# Patient Record
Sex: MALE | Race: WHITE
[De-identification: names, ages, dates, MRNs, and addresses within clinical notes are randomized per-mention and may not be internally consistent; named-entity substitution may affect disease eponyms.]

---

## 2020-01-31 ENCOUNTER — HOSPITAL ENCOUNTER (OUTPATIENT)
Dept: HOSPITAL 53 - M LAB REF | Age: 80
End: 2020-01-31
Attending: DERMATOLOGY
Payer: COMMERCIAL

## 2020-01-31 DIAGNOSIS — C44.222: Primary | ICD-10-CM

## 2020-03-04 ENCOUNTER — HOSPITAL ENCOUNTER (OUTPATIENT)
Dept: HOSPITAL 53 - M LAB REF | Age: 80
End: 2020-03-04
Attending: DERMATOLOGY
Payer: COMMERCIAL

## 2020-03-04 DIAGNOSIS — C44.222: Primary | ICD-10-CM

## 2020-09-22 ENCOUNTER — HOSPITAL ENCOUNTER (OUTPATIENT)
Dept: HOSPITAL 53 - M PAIN | Age: 80
End: 2020-09-22
Attending: NURSE PRACTITIONER
Payer: COMMERCIAL

## 2020-09-22 DIAGNOSIS — M96.1: Primary | ICD-10-CM

## 2020-09-22 DIAGNOSIS — Z79.891: ICD-10-CM

## 2020-12-22 ENCOUNTER — HOSPITAL ENCOUNTER (OUTPATIENT)
Dept: HOSPITAL 53 - M PAIN | Age: 80
End: 2020-12-22
Attending: NURSE PRACTITIONER
Payer: COMMERCIAL

## 2020-12-22 DIAGNOSIS — Z86.59: ICD-10-CM

## 2020-12-22 DIAGNOSIS — M47.816: Primary | ICD-10-CM

## 2020-12-22 DIAGNOSIS — Z79.899: ICD-10-CM

## 2020-12-22 DIAGNOSIS — Z87.891: ICD-10-CM

## 2020-12-22 DIAGNOSIS — K21.9: ICD-10-CM

## 2020-12-22 DIAGNOSIS — G89.29: ICD-10-CM

## 2020-12-31 NOTE — ECWPNPC
PATIENT NAME: HÉCTOR HINKLE

: 1940

GENDER: MALE

MRN: [id number]

VISIT DATE: 2020

DISCHARGE DATE: 20 1353

VISIT LOCKED DATE TIME: 

PHYSICIAN: PIETER YOUNG

RESOURCE: PIETER YOUNG

 

           

           

REASON FOR APPOINTMENT

           

          1. MED MANAGEMENT / UTOX

           

HISTORY OF PRESENT ILLNESS

           

      PAIN CENTER INTAKE QUESTIONS:

      DO YOU HAVE A HISTORY OF MRSA?

           

           

          :NO

           

      DO YOU TAKE A BLOOD THINNERS?

           

           

          :NO

           

      DO YOU HAVE ANY BLEEDING DISORDERS?

           

           

          :NO

           

      ANY NEW NUMBNESS OR WEAKNESS IN YOUR LEGS OR ARMS?

           

           

          :NO

           

      ANY PACEMAKER,DEFIBRILLATOR, OR DORSAL COLUMN

          STIMULATOR?

           

           

          :NO

           

      DO YOU HAVE ANY RASHES OR OPEN SORES?

           

           

          :NO

           

      ARE YOU ALLERGIC TO IV DYE?

           

           

          :NO

           

      ARE YOU DIABETIC?

           

           

          :NO

           

      ANY NEW PROBLEMS WITH YOUR MEDICATIONS?

           

           

          :NO

           

      HAVE YOU RECEIVED A VACCINE IN THE PAST 30 DAYS?

           

           

          :NO

           

      DO YOU PLAN TO RECEIVE A VACCINE IN THE NEXT 21

          DAYS?

           

           

          :NO

           

      DO YOU NEED ANY PRESCRIPTION?

           

           

          :NO

           

      DO YOU TAKE ANY IMMUNOSUPPRESSIVE MEDICATIONS?

           

           

          :NO

           

      IS THERE A CHANCE YOU COULD BE PREGNANT?

           

           

          :NO

           

      ARE YOU BREAST FEEDING?

           

           

          :NO

           

      GENERAL:  PATIENT IS HERE FOR 3 MONTH FOLLOW-UP AND

          MEDICATION MANAGEMENT FOR CHRONIC LOW BACK PAIN WITH HISTORY OF

          LUMBAR SURGERY. THIS IS INITIAL FOLLOW-UP AFTER FIRST VISIT 3

          MONTHS AGO. CONTINUES TO FIND HYDROCODONE 7.5/325 EVERY 6 HOURS

          AS NEEDED FOR PAIN HELPFUL AT REDUCING HIS PAIN WITHOUT SIDE

          EFFECTS. HE IS NOT INTERESTED IN INTERVENTIONAL THERAPIES. BRINGS

          IN HIS MEDICATION WHICH IS APPROPRIATE FOR WHAT WAS DISPENSED.

           -.

           

      FALL RISK SCREENING:

      SCREENING

           

           

          :NO FALLS REPORTED IN THE LAST YEAR

           

      PAIN SCREENING:

      PATIENT HAS A COMPLAINT OF ACUTE OR CHRONIC PAIN

           

           

          :YES

          LOCATION OF PAIN:RIGHT SHOULDER, LEFT SHOULDER, LOW BACK

          INTENSITY OF PAIN (SCALE OF 1 TO 10):6

          WHAT DOES YOUR PAIN FEEL LIKE:CONTINOUS, TENDER, ACHING

           

      NURSING NOTE:

           -UPDATED NARCOTIC AGREEMENT.

           

CURRENT MEDICATIONS

           

          TAKING FLOMAX 0.4 MG CAPSULE 1 CAPSULE 30 MINUTES AFTER THE SAME

          MEAL EACH DAY ORALLY ONCE A DAY

          TAKING HYDROCHLOROTHIAZIDE 25 MG TABLET 1 TABLET ORALLY ONCE A

          DAY

          TAKING PREVACID 15 MG CAPSULE DELAYED RELEASE 1 CAPSULE BEFORE A

          MEAL ORALLY BID

          TAKING FLUOXETINE HCL 20 MG CAPSULE 1 CAPSULE IN THE MORNING

          ORALLY ONCE A DAY

          TAKING LISINOPRIL 40 MG TABLET 1 TABLET ORALLY ONCE A DAY

          TAKING HYDROCODONE-ACETAMINOPHEN 7.5-325 MG TABLET 1 TABLET AS

          NEEDED ORALLY EVERY 6 HRS MDD4

          NOT-TAKING HYDROCODONE-ACETAMINOPHEN 5-500 MG TABLET 2 TABLET AS

          NEEDED ORALLY EVERY 6 HRS

          NOT-TAKING METAXALONE 800 MG TABLET 1 TABLET ORALLY THREE TIMES A

          DAY PRN

          NOT-TAKING ETODOLAC 400 MG TABLET 1 TABLET ORALLY TWICE A DAY

          NOT-TAKING VASOTEC 10 MG TABLET 1 TABLET ORALLY TWICE A DAY

          NOT-TAKING SKELAXIN 800 MG TABLET 1 TABLET ORALLY THREE TIMES A

          DAY

          NOT-TAKING DOXYCYCLINE HYCLATE 100 MG CAPSULE 1 CAPSULE ORALLY

          TWICE A DAY

          NOT-TAKING VICODIN 5-500 MG TABLET 1 TABLET AS NEEDED ORALLY BID

          NOT-TAKING HYDROCODONE-ACETAMINOPHEN  MG TABLET 1 TABLET AS

          NEEDED ORALLY EVERY 6 HRS

          NOT-TAKING DOXYCYCLINE HYCLATE 100 MG CAPSULE 1 CAPSULE ORALLY

          BID

          MEDICATION LIST REVIEWED AND RECONCILED WITH THE PATIENT

           

PAST MEDICAL HISTORY

           

          ABDOMINAL ANEURYSM

          CALCULUS OF KIDNEY

          DPERESSIVE DISORDER

          LUMBAGO

          REFLUX ESOPHAGITIS

          RENAL COLIC

          HTN

           

ALLERGIES

           

          N.K.D.A.

           

SURGICAL HISTORY

           

          APPENDECTOMY 

          BACK 

          THORAXIC 

          RADIATION: SEED IMPLANTS

          CYSTOSCOPY 

           

FAMILY HISTORY

           

          FATHER:  45 YRS, CEREBRAL ANEURYSM

          MOTHER:  33 YRS, LUNG CANCER

          3 SISTER(S) - HEALTHY. 2 SON(S) , 1 DAUGHTER(S) .

          1 SISTER IS  FROM CANCER. OLDEST SON HEALTH ISSUES. NO

          KNOWN FAMILY HISTORY OF UROLOGICAL DISEASES OR CANCERS.DENIES

          FAMILY HX OF MELANOMA AND PANCREATIC CANCER.

           

SOCIAL HISTORY

           

          GENERAL:

           

          TOBACCO USE  ARE YOU A:: FORMER SMOKER , HOW LONG HAS IT BEEN

          SINCE YOU LAST SMOKED?: 1-5 YEARS.

           

           

          LATEX QUESTIONNAIRE

          LATEX ALLERGY : HAVE YOU EVER DEVELOPED ANY TYPE OF REACTION

          AFTER HANDLING LATEX PRODUCTS SUCH AS RUBBER GLOVES, CONDOMS,

          DIAPHRAGMS, BALLOONS, SOCKS, OR UNDERWEAR?NO

           

           

          ALCOHOL SCREENING  POINTS: 0, INTERPRETATION: NEGATIVE.

           

           

          RECREATIONAL DRUG USE  DENIES.

           

           

          CAFFEINE  2-5/DAY.

           

           

          SEXUAL HX  HAD SEX IN THE LAST 12 MONTHS (VAGINAL, ORAL, OR

          ANAL)?: NO, HAVE YOU EVER HAD AN STD?: YES, OTHER?: YES.

           

           

          LANGUAGE  ENGLISH.

           

           

          LEARNING BARRIERS / SPECIAL NEEDS

          VISION IMPAIRED?YES

           

           

          DIET: REGULAR.

           

           

          EXERCISE: NO REGULAR EXERCISE.

           

           

          MARITAL STATUS: .

           

           

          OTHERS AT HOME: SPOUSE, CHILD.

           

HOSPITALIZATION/MAJOR DIAGNOSTIC PROCEDURE

           

          SURGICALLY RELATED

           

REVIEW OF SYSTEMS

           

      CONSTITUTIONAL:

           

          ANY RECENT FEVER    NO . CHILLS    NO . WEIGHT CHANGE OF UNKNOWN

          REASONS    NO .

           

      GASTROENTEROLOGY:

           

          NEW UNEXPLAINABLE CHANGES IN BOWEL CONTROL    NO . CONSTIPATION  

           NO .

           

      GENITOURINARY:

           

          ANY NEW CHANGE IN BLADDER CONTROL?    NO .

           

      NEUROLOGY:

           

          NEW ONSET DIZZINESS OR NEUROLOGICAL CHANGES NOT MENTIONED    NO .

          NEW NUMBNESS OR PAIN PATTERNS NOT MENTIONED AND PERTINENT TO

          TODAY'S VISIT    NO .

           

      CARDIOLOGY:

           

          NEW CHEST PRESSURE    NO . NEW CHEST PAIN    NO .

           

      RESPIRATORY:

           

          UNEXPLAINABLE COUGH    NO . NEW SHORTNESS OF BREATH    NO .

           

VITAL SIGNS

           

          .6 LBS, HT 70 IN, BMI 31.22 INDEX, /68 MM HG, HR 91

          /MIN, RR 18 /MIN, TEMP 98.4 F, OXYGEN SAT % 92%, NA INITIALS

          SC13:09.

           

EXAMINATION

           

      GENERAL EXAMINATION:

          GENERALAWAKE,ALERT ,PLEASANT .

           

          PSYCHAFFECT NORMAL .

           

          LUNGS:LUNG MARROQUIN ARE CLEAR TO AUSCULTATION BILATERALLY. GOOD

          MOVEMENT OF AIR .

           

          HEART:S1, S2 IN A REGULAR RATE AND RHYTHM. NO SIGNIFICANT

          MURMURS, RUBS OR GALLOPS NOTED .

           

ASSESSMENTS

           

          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

           

TREATMENT

           

      LUMBAR SPONDYLOSIS

          CONTINUE HYDROCODONE-ACETAMINOPHEN TABLET, 7.5-325 MG, 1 TABLET

          AS NEEDED, ORALLY, EVERY 6 HRS MDD4

          NOTES: ISTOP REGISTRY REVIEWED AND DEMONSTRATES COMPLLIANCE.

          BRINGS IN MEDICATIONS WHICH IS APPROPRIATE FOR WHAT WAS

          DISPENSED.

           

          , [facility] PAIN CENTER NARCOTIC AGREEMENT WAS REVIEWED AND

          SIGNED TODAY BY THE PATIENT. SEE ATTACHED DOCUMENT FOR FULL

          DETAILS; SPECIFIC ISSUES WERE REVIEWED: 1) KEEP PAIN MEDS IN

          THEIR ORIGINAL BOTTLES AND ANY WEEKLY PLANNERS ARE TO BE BROUGHT

          TO THE PAIN CENTER AT EVERY VISIT. 2) THE PATIENT IS NOT TO

          INCREASE DOSING OR TIMING OF THEIR PAIN MEDICATION WITHOUT

          SPECIFIC DIRECTION OF THEIR PAIN CENTERPROVIDER (NOT ER OR OTHER

          PROVIDERS). 3) ALL PAIN MEDS ARE TO BE KEPT SECURED, IN A LOCKED

          BOX. 4) NO PAIN MEDS ARE TO BE SHARED WITH ANY OTHER PERSON FOR

          ANY REASON. 5) NO PAIN MEDS MAY BE TAKEN FROM ANY FRIENDS OR

          RELATIVES FOR ANY REASON 6) NO MEDS OR SUBSTANCES WHICH ARE NOT

          LEGAL ARE TO BE USED- NO MARIJUANA, NO COCAINE, AMPHETAMINES,

          HEROIN, OR OTHERS ARE EVER TO BE USED. 7)URINE TESTING IS DONE TO

          ACCOUNT FOR MEDS AND SUBSTANCES BEING TAKEN AND WILL BE DONE

          RANDOMLY.

           

          URINE TOXICOLOGY TODAY.

           

PROCEDURE CODES

           

           ESTABILISHED PATIENT [facility] FACILITY CHARGE

           

DISPOSITION & COMMUNICATION

           

FOLLOW UP

           

          3 MONTHS (REASON: MEDICATION MANAGEMENT/REVIEW TOXICOLOGY)

           

 

ELECTRONICALLY SIGNED BY HAKEEM LEAHY ON

          2020 AT 04:00 PM EST

           

           

           

 

DISCLAIMER :

THIS IS A VISIT SUMMARY EXTRACTED FROM THE [employer] CHART.

IT IS NOT A COPY OF THE [employer] PROGRESS NOTE.

BRETT

## 2021-03-22 ENCOUNTER — HOSPITAL ENCOUNTER (OUTPATIENT)
Dept: HOSPITAL 53 - M PAIN | Age: 81
End: 2021-03-22
Attending: NURSE PRACTITIONER
Payer: COMMERCIAL

## 2021-03-22 DIAGNOSIS — Z79.899: ICD-10-CM

## 2021-03-22 DIAGNOSIS — K21.00: ICD-10-CM

## 2021-03-22 DIAGNOSIS — M47.816: Primary | ICD-10-CM

## 2021-03-22 DIAGNOSIS — I10: ICD-10-CM

## 2021-03-22 DIAGNOSIS — F32.9: ICD-10-CM

## 2021-03-22 DIAGNOSIS — Z79.891: ICD-10-CM

## 2021-03-22 DIAGNOSIS — Z87.891: ICD-10-CM

## 2021-05-26 ENCOUNTER — HOSPITAL ENCOUNTER (OUTPATIENT)
Dept: HOSPITAL 53 - M SMT | Age: 81
End: 2021-05-26
Attending: NURSE PRACTITIONER
Payer: COMMERCIAL

## 2021-05-26 DIAGNOSIS — N40.0: Primary | ICD-10-CM

## 2021-05-26 LAB
APPEARANCE UR: CLEAR
BACTERIA UR QL AUTO: (no result)
BILIRUB UR QL STRIP.AUTO: NEGATIVE
GLUCOSE UR QL STRIP.AUTO: NEGATIVE MG/DL
HGB UR QL STRIP.AUTO: NEGATIVE
KETONES UR QL STRIP.AUTO: NEGATIVE MG/DL
LEUKOCYTE ESTERASE UR QL STRIP.AUTO: (no result)
MUCOUS THREADS URNS QL MICRO: (no result)
NITRITE UR QL STRIP.AUTO: NEGATIVE
PH UR STRIP.AUTO: 5 UNITS (ref 5–9)
PROT UR QL STRIP.AUTO: NEGATIVE MG/DL
RBC # UR AUTO: 1 /HPF (ref 0–3)
SP GR UR STRIP.AUTO: 1.01 (ref 1–1.03)
SQUAMOUS #/AREA URNS AUTO: 0 /HPF (ref 0–6)
UROBILINOGEN UR QL STRIP.AUTO: 0.2 MG/DL (ref 0–2)
WBC #/AREA URNS AUTO: 12 /HPF (ref 0–3)

## 2021-05-26 PROCEDURE — 81001 URINALYSIS AUTO W/SCOPE: CPT

## 2021-05-26 PROCEDURE — 87088 URINE BACTERIA CULTURE: CPT

## 2021-05-26 PROCEDURE — 87186 SC STD MICRODIL/AGAR DIL: CPT

## 2021-06-03 ENCOUNTER — HOSPITAL ENCOUNTER (EMERGENCY)
Dept: HOSPITAL 53 - M ED | Age: 81
Discharge: HOME | End: 2021-06-03
Payer: COMMERCIAL

## 2021-06-03 VITALS — SYSTOLIC BLOOD PRESSURE: 118 MMHG | DIASTOLIC BLOOD PRESSURE: 70 MMHG

## 2021-06-03 VITALS — BODY MASS INDEX: 29.41 KG/M2 | HEIGHT: 70 IN | WEIGHT: 205.43 LBS

## 2021-06-03 DIAGNOSIS — Z87.891: ICD-10-CM

## 2021-06-03 DIAGNOSIS — G89.29: ICD-10-CM

## 2021-06-03 DIAGNOSIS — R39.12: ICD-10-CM

## 2021-06-03 DIAGNOSIS — Z86.79: ICD-10-CM

## 2021-06-03 DIAGNOSIS — Z79.2: ICD-10-CM

## 2021-06-03 DIAGNOSIS — Z92.3: ICD-10-CM

## 2021-06-03 DIAGNOSIS — T44.3X5A: ICD-10-CM

## 2021-06-03 DIAGNOSIS — Z79.899: ICD-10-CM

## 2021-06-03 DIAGNOSIS — Z87.442: ICD-10-CM

## 2021-06-03 DIAGNOSIS — N39.0: Primary | ICD-10-CM

## 2021-06-03 DIAGNOSIS — Z85.46: ICD-10-CM

## 2021-06-03 DIAGNOSIS — Z98.890: ICD-10-CM

## 2021-06-03 DIAGNOSIS — Z80.1: ICD-10-CM

## 2021-06-03 DIAGNOSIS — I10: ICD-10-CM

## 2021-06-03 DIAGNOSIS — Z90.89: ICD-10-CM

## 2021-06-03 DIAGNOSIS — J44.9: ICD-10-CM

## 2021-06-03 DIAGNOSIS — Z87.440: ICD-10-CM

## 2021-06-03 DIAGNOSIS — E11.9: ICD-10-CM

## 2021-06-03 DIAGNOSIS — Z82.49: ICD-10-CM

## 2021-06-03 DIAGNOSIS — K21.9: ICD-10-CM

## 2021-06-03 DIAGNOSIS — M54.9: ICD-10-CM

## 2021-06-25 ENCOUNTER — HOSPITAL ENCOUNTER (OUTPATIENT)
Dept: HOSPITAL 53 - M PAIN | Age: 81
End: 2021-06-25
Attending: NURSE PRACTITIONER
Payer: COMMERCIAL

## 2021-06-25 DIAGNOSIS — Z79.84: ICD-10-CM

## 2021-06-25 DIAGNOSIS — Z79.82: ICD-10-CM

## 2021-06-25 DIAGNOSIS — Z87.442: ICD-10-CM

## 2021-06-25 DIAGNOSIS — I10: ICD-10-CM

## 2021-06-25 DIAGNOSIS — M47.816: Primary | ICD-10-CM

## 2021-06-25 DIAGNOSIS — Z79.899: ICD-10-CM

## 2021-06-25 DIAGNOSIS — Z79.1: ICD-10-CM

## 2021-06-25 DIAGNOSIS — Z87.891: ICD-10-CM

## 2021-06-25 DIAGNOSIS — F32.9: ICD-10-CM

## 2021-06-25 DIAGNOSIS — Z79.891: ICD-10-CM

## 2021-06-25 DIAGNOSIS — M54.5: ICD-10-CM

## 2021-06-25 DIAGNOSIS — K21.00: ICD-10-CM

## 2021-06-29 NOTE — ECWPNPC
PATIENT NAME: HÉCTOR HINKLE

: 1940

GENDER: MALE

MRN: [id number]

VISIT DATE: 2021

DISCHARGE DATE: 21 1327

VISIT LOCKED DATE TIME: 

PHYSICIAN: PIETRE YOUNG

RESOURCE: PIETER YOUNG

 

           

           

REASON FOR APPOINTMENT

           

          1. LOW BACK/MED MGMNT/UTOX

           

HISTORY OF PRESENT ILLNESS

           

      GENERAL:  HERE FOR FOLLOW-UP AND MEDICATION

          MANAGEMENT FOR CHRONIC LOW BACK PAIN. FINDS CURRENT CHRONIC PAIN

          MEDICATION HELPFUL AT REDUCING PAIN AND KEEPING HIM FUNCTIONAL.

          DENIES ADVERSE SIDE EFFECTS WITH MEDICATION. BRINGS IN HIS

          MEDICINE WHICH IS APPROPRIATE FOR WHAT WAS DISPENSED.

           -.

           

      FALL RISK SCREENING:

      SCREENING

          ONE FALL THIS YEAR NO MAJOR INJURIES BUT DID GO TO THE ER.

           

      PAIN SCREENING:

      PATIENT HAS A COMPLAINT OF ACUTE OR CHRONIC PAIN

           

           

          :YES

          LOCATION OF PAIN:LOW BACK

          INTENSITY OF PAIN (SCALE OF 1 TO 10):6

          WHAT DOES YOUR PAIN FEEL LIKE:ACHING, CONTINOUS

          DURATION:CONTINOUS, CONSTANT, ONLY WITH SPECIFIC ACTIVITIES

          PAIN IS INCREASED BY:ACTIVITIES

          PAIN IS DECREASED BY:USE OF PAIN MEDICATIONS

           

      NURSING NOTE:

           -.

           

      PAIN CENTER INTAKE QUESTIONS:

      DO YOU HAVE A HISTORY OF MRSA?

           

           

          :NO

           

      DO YOU TAKE A BLOOD THINNERS?

           

           

          :NO ASPIR-LOW 81 MG

           

      DO YOU HAVE ANY BLEEDING DISORDERS?

           

           

          :NO

           

      ANY NEW NUMBNESS OR WEAKNESS IN YOUR LEGS OR ARMS?

           

           

          :NO

           

      ANY PACEMAKER,DEFIBRILLATOR, OR DORSAL COLUMN

          STIMULATOR?

           

           

          :NO

           

      DO YOU HAVE ANY RASHES OR OPEN SORES?

           

           

          :NO

           

      ARE YOU ALLERGIC TO IV DYE?

           

           

          :NO

           

      ARE YOU DIABETIC?

           

           

          :NO BORDER LINE

           

      ANY NEW PROBLEMS WITH YOUR MEDICATIONS?

           

           

          :NO

           

      HAVE YOU RECEIVED A VACCINE IN THE PAST 30 DAYS?

           

           

          :YES 1ST COVID 3/6/2021

           

      DO YOU PLAN TO RECEIVE A VACCINE IN THE NEXT 21

          DAYS?

           

           

          :YES

          IF SO WHAT VACCINE AND WHEN? 2ND COVID 2021

           

      DO YOU NEED ANY PRESCRIPTION?

           

           

          :NO

           

      DO YOU TAKE ANY IMMUNOSUPPRESSIVE MEDICATIONS?

           

           

          :NO

           

      IS THERE A CHANCE YOU COULD BE PREGNANT?

           

           

          :NO

           

      ARE YOU BREAST FEEDING?

           

           

          :NO

           

CURRENT MEDICATIONS

           

          TAKING VASOTEC 10 MG TABLET 1 TABLET ORALLY TWICE A DAY

          TAKING TIZANIDINE HCL 2 MG TABLET 1 TABLET AS NEEDED ORALLY THREE

          TIMES A DAY

          TAKING LOVASTATIN 40 MG TABLET 1 TABLET WITH THE EVENING MEAL

          ORALLY ONCE A DAY

          TAKING FISH OIL 1000 MG CAPSULE 1 CAPSULE ORALLY ONCE A DAY

          TAKING ASPIR-LOW 81 MG TABLET DELAYED RELEASE 1 TABLET ORALLY

          ONCE A DAY

          TAKING METFORMIN HCL  MG TABLET EXTENDED RELEASE 24 HOUR 1

          TABLET WITH EVENING MEAL ORALLY ONCE A DAY

          TAKING PANTOPRAZOLE SODIUM 40 MG TABLET DELAYED RELEASE 1 TABLET

          ORALLY ONCE A DAY

          TAKING ALENDRONATE SODIUM 70 MG TABLET 1 TABLET 30 MINUTES BEFORE

          THE FIRST FOOD, BEVERAGE OR MEDICINE OF THE DAY WITH PLAIN WATER

          ORALLY

          TAKING TAMSULOSIN HCL 0.4 MG CAPSULE 1 CAPSULE ORALLY ONCE A DAY

          TAKING FINASTERIDE 5 MG TABLET 1 TABLET ORALLY ONCE A DAY

          TAKING ROPINIROLE HCL 0.25 MG TABLET 1 TABLET 1 TO 3 HOURS BEFORE

          BEDTIME ORALLY ONCE A DAY

          TAKING MELOXICAM 7.5 MG TABLET 1 TABLET ORALLY ONCE A DAY

          TAKING FLUOXETINE HCL 20 MG CAPSULE 1 CAPSULE ORALLY ONCE A DAY

          TAKING TIZANIDINE HCL 2 MG TABLET 1 TABLET AS NEEDED ORALLY THREE

          TIMES A DAY

          TAKING VITAMIN B12 100 MCG TABLET AS DIRECTED ORALLY

          TAKING CLOTRIMAZOLE 1 % CREAM 1 APPLICATION EXTERNALLY TWICE A

          DAY

          TAKING LISINOPRIL 20 MG TABLET 1 TABLET ORALLY 12.5MG ONCE A DAY

          TAKING OXYBUTYNIN CHLORIDE 5 MG TABLET 1 TABLET ORALLY TWICE A

          DAY

          TAKING BACTRIM -160 MG TABLET 1 TABLET ORALLY AS DIRECTED-1

          HOUR PRIOR TO CYSTOSCOPY

          TAKING CIPRO 500 MG TABLET 1 TABLET ORALLY EVERY 12 HRS

          TAKING HYDROCODONE-ACETAMINOPHEN 7.5-325 MG TABLET 1 TABLET AS

          NEEDED ORALLY EVERY 6 HRS MDD4

          NOT-TAKING HYDROCODONE-ACETAMINOPHEN 7.5-325 MG TABLET 1 TABLET

          AS NEEDED ORALLY EVERY 6 HRS

          NOT-TAKING FLOMAX 0.4 MG CAPSULE 1 CAPSULE 30 MINUTES AFTER THE

          SAME MEAL EACH DAY ORALLY ONCE A DAY

          NOT-TAKING HYDROCHLOROTHIAZIDE 25 MG TABLET 1 TABLET ORALLY ONCE

          A DAY

          NOT-TAKING PREVACID 15 MG CAPSULE DELAYED RELEASE 1 CAPSULE

          BEFORE A MEAL ORALLY BID

          NOT-TAKING FLUOXETINE HCL 20 MG CAPSULE 1 CAPSULE IN THE MORNING

          ORALLY ONCE A DAY

          MEDICATION LIST REVIEWED AND RECONCILED WITH THE PATIENT

           

PAST MEDICAL HISTORY

           

          ABDOMINAL ANEURYSM

          CALCULUS OF KIDNEY

          DPERESSIVE DISORDER

          LUMBAGO

          REFLUX ESOPHAGITIS

          RENAL COLIC

          HTN

          BACK PAIN

           

ALLERGIES

           

          N.K.D.A.

           

SURGICAL HISTORY

           

          APPENDECTOMY 1971

          BACK 1984

          THORAXIC 

          RADIATION: SEED IMPLANTS

          CYSTOSCOPY 

          SCAR TISSUE REMOVAL ON URETHRA 2020

           

FAMILY HISTORY

           

          FATHER:  45 YRS, CEREBRAL ANEURYSM

          MOTHER:  33 YRS, LUNG CANCER, COPD

          3 SISTER(S) - HEALTHY. 2 SON(S) , 1 DAUGHTER(S) .

          1 SISTER IS  FROM CANCER. OLDEST SON HEALTH ISSUES. NO

          KNOWN FAMILY HISTORY OF UROLOGICAL DISEASES OR CANCERS.DENIES

          FAMILY HX OF MELANOMA AND PANCREATIC CANCER.

           

SOCIAL HISTORY

           

          GENERAL:

           

          TOBACCO USE

          ARE YOU A:FORMER SMOKER STOP 

           

           

          LATEX QUESTIONNAIRE

          LATEX ALLERGY : HAVE YOU EVER DEVELOPED ANY TYPE OF REACTION

          AFTER HANDLING LATEX PRODUCTS SUCH AS RUBBER GLOVES, CONDOMS,

          DIAPHRAGMS, BALLOONS, SOCKS, OR UNDERWEAR?NO

          LATEX ALLERGY : HAVE YOU EVER DEVELOPED ANY TYPE OF REACTION

          DURING OR AFTER DENTAL APPOINTMENT, VAGINAL/RECTAL EXAMINATION,

          SURGICAL PROCEDURE, OR ANY OTHER EXPOSURE?NO

          LATEX RISK : HAVE YOU EVER HAD ANY DIFFICULTY BREATHING OR HIVES

          AFTER EATING OR HANDLING ANY FRUITS, OR VEGETABLES; SUCH AS KIWI,

          BANANAS, STONE FRUITS, OR CHESTNUTSNO

          LATEX RISK : DO YOU HAVE A PREVIOUS PERSONAL HISTORY OF MORE THAN

          NINE SURGERIES, SPINA BIFIDA, OR REPEATED CATHERIZATIONS? NO

          LATEX RISK : ARE YOU FREQUENTLY EXPOSED TO LATEX PRODUCTS IN YOUR

          OCCUPATION?NO

          DATE ASKED : 2021

           

           

          ALCOHOL USE: NO.

           

           

          ALCOHOL SCREENING  POINTS: 0, INTERPRETATION: NEGATIVE.

           

           

          RECREATIONAL DRUG USE  DENIES.

           

           

          CAFFEINE  2-5/DAY.

           

           

          SEXUAL HX  HAD SEX IN THE LAST 12 MONTHS (VAGINAL, ORAL, OR

          ANAL)?: NO, HAVE YOU EVER HAD AN STD?: YES, OTHER?: YES.

           

           

          LANGUAGE  ENGLISH.

           

           

          LEARNING BARRIERS / SPECIAL NEEDS

          CHANGE FROM LAST VISIT?NO

          BARRIERS TO LEARNING?NO

          HEARING IMPAIRED?NO NOT REALLY

          VISION IMPAIRED?YES

          :CORRECTIVE LENSES

          COGNITIVELY IMPAIRED?NO

          READINESS TO LEARN?NO

          LEARNING PREFERENCES?NO

          LEARNING CAPABILITIES PRESENT?YES

          EMOTIONAL BARRIERS?NO

          SPECIAL DEVICES?NO

           NEEDED?NO

           

           

          DIET: REGULAR.

           

           

          EXERCISE: NO REGULAR EXERCISE.

           

           

          MARITAL STATUS: .

           

           

          OTHERS AT HOME: SPOUSE, CHILD.

           

HOSPITALIZATION/MAJOR DIAGNOSTIC PROCEDURE

           

          SURGICALLY RELATED

          ONE FALL NO MAJOR INJURES DID STAY OVER NIGHT

           

REVIEW OF SYSTEMS

           

      CONSTITUTIONAL:

           

          ANY RECENT FEVER    NO . CHILLS    NO . WEIGHT CHANGE OF UNKNOWN

          REASONS    NO .

           

      GASTROENTEROLOGY:

           

          NEW UNEXPLAINABLE CHANGES IN BOWEL CONTROL    NO . CONSTIPATION  

           NO .

           

      GENITOURINARY:

           

          ANY NEW CHANGE IN BLADDER CONTROL?    NO .

           

      NEUROLOGY:

           

          NEW ONSET DIZZINESS OR NEUROLOGICAL CHANGES NOT MENTIONED    NO .

          NEW NUMBNESS OR PAIN PATTERNS NOT MENTIONED AND PERTINENT TO

          TODAY'S VISIT    NO .

           

      CARDIOLOGY:

           

          NEW CHEST PRESSURE    NO . PATIENT DENIES    NO .

           

      RESPIRATORY:

           

          UNEXPLAINABLE COUGH    NO . NEW SHORTNESS OF BREATH    NO .

           

VITAL SIGNS

           

           LBS, HT 70 IN, BMI 30.42 INDEX, /82 MM HG, HR 75

          /MIN, RR 18 /MIN, TEMP 97.6 F, OXYGEN SAT % 94%, NA INITIALS SC

          13:08.

           

EXAMINATION

           

      GENERAL EXAMINATION:

          GENERALAWAKE,ALERT ,PLEASANT .

           

          PSYCHAFFECT NORMAL .

           

          LUNGS:LUNG MARROQUIN ARE CLEAR TO AUSCULTATION BILATERALLY. GOOD

          MOVEMENT OF AIR .

           

          HEART:S1, S2 IN A REGULAR RATE AND RHYTHM. NO SIGNIFICANT

          MURMURS, RUBS OR GALLOPS NOTED .

           

ASSESSMENTS

           

          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

           

          LONG TERM (CURRENT) USE OF OPIATE ANALGESIC - Z79.891

           

TREATMENT

           

      LUMBAR SPONDYLOSIS

          CONTINUE HYDROCODONE-ACETAMINOPHEN TABLET, 7.5-325 MG, 1 TABLET

          AS NEEDED, ORALLY, EVERY 6 HRS MDD4

          LAB: URINE TEST GROUP

           

          ISELA LIANG 2021 1:23:06 PM > LAST DOSE: HYDROCODONE

          2021 @7AM

           

           

          NOTES: ISTOP REGISTRY REVIEWED AND DEMONSTRATES COMPLLIANCE.

          BRINGS IN MEDICATIONS WHICH IS APPROPRIATE FOR WHAT WAS

          DISPENSED. RECENT URINE TOXICOLOGY REVIEWED. NO UNAUTHORIZED

          MEDICATIONS. NO ILLICIT SUBSTANCES AND PRESCRIBED MEDICATIONS

          WERE PRESENT.

           

          , RISKS OF NARCOTIC/OPIOD MEDICATIONS INCLUDES BUT IS NOT LIMITED

          TO RISK OF DEPENDANCE/DEVELOPMENT OF ADDICTION, MOOD DISTURBANCE

          AND DEPRESSION, OSTEOPOROSIS, HORMONAL AND LABIDAL CHANGES,

          RESPIRATORY DEPRESSION AND DEATH. PATIENT IS ADVISED NOT TO DRIVE

          OR DRINK ALCOHOL WHILE ON THESE MEDICATIONS.

           

PROCEDURE CODES

           

           ESTABILISHED PATIENT [facility] CHARGE

           

DISPOSITION & COMMUNICATION

           

FOLLOW UP

           

          3 MONTHS (REASON: MED MGMNT/REVIEW UTOX)

           

 

ELECTRONICALLY SIGNED BY HAKEEM LEAHY ON

          2021 AT 12:52 PM EDT

           

           

           

 

DISCLAIMER :

THIS IS A VISIT SUMMARY EXTRACTED FROM THE [employer] CHART.

IT IS NOT A COPY OF THE [employer] PROGRESS NOTE.

MTDD

## 2021-08-05 ENCOUNTER — HOSPITAL ENCOUNTER (OUTPATIENT)
Dept: HOSPITAL 53 - M SDC | Age: 81
Discharge: HOME | End: 2021-08-05
Attending: UROLOGY
Payer: COMMERCIAL

## 2021-08-05 VITALS — SYSTOLIC BLOOD PRESSURE: 134 MMHG | DIASTOLIC BLOOD PRESSURE: 78 MMHG

## 2021-08-05 VITALS — BODY MASS INDEX: 29.46 KG/M2 | HEIGHT: 70 IN | WEIGHT: 205.8 LBS

## 2021-08-05 DIAGNOSIS — N35.911: Primary | ICD-10-CM

## 2021-08-05 DIAGNOSIS — K21.9: ICD-10-CM

## 2021-08-05 DIAGNOSIS — Z79.84: ICD-10-CM

## 2021-08-05 DIAGNOSIS — E11.9: ICD-10-CM

## 2021-08-05 DIAGNOSIS — M19.90: ICD-10-CM

## 2021-08-05 DIAGNOSIS — G89.4: ICD-10-CM

## 2021-08-05 DIAGNOSIS — R35.0: ICD-10-CM

## 2021-08-05 DIAGNOSIS — I10: ICD-10-CM

## 2021-08-05 DIAGNOSIS — I71.2: ICD-10-CM

## 2021-08-05 DIAGNOSIS — F32.9: ICD-10-CM

## 2021-08-05 DIAGNOSIS — Z79.899: ICD-10-CM

## 2021-08-05 DIAGNOSIS — J44.9: ICD-10-CM

## 2021-08-05 DIAGNOSIS — R32: ICD-10-CM

## 2021-08-05 DIAGNOSIS — Z79.82: ICD-10-CM

## 2021-08-05 PROCEDURE — 87088 URINE BACTERIA CULTURE: CPT

## 2021-08-05 PROCEDURE — 52276 CYSTOSCOPY AND TREATMENT: CPT

## 2021-08-05 NOTE — ROOPDOC
[facility] Report Of Operation


Report of Operation


DATE OF PROCEDURE: 8/5/21





PREPROCEDURE DIAGNOSES: [urethral stricture].





POSTPROCEDURE DIAGNOSES: [same].





PROCEDURE PERFORMED: [dviu with complex catheter placement]. 





SURGEON: [PRAMOD Camilo], MD





ASSISTANT: [none], MD





ANESTHESIA: [general lma].





ESTIMATED BLOOD LOSS: Approximately [1] mL. 





COMPLICATIONS: [none]. 





REMARKS: [79yo wm.  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  S/p seeds

 and radiation in past for prostate cancer.  Incontinence.  Surgery arranged to 

open urethra.  No guarantees given.  Informed consent obtained.  Risks discussed

 including infection, pain, bleeding, scarring, worsening of incontinence, 

retention, need for more surgery, injury to gu tract, recurrence of stricture 

and others.].





FINDINGS: [francisco indistinct prostate]





SPECIMENS REMOVED: [urine cx]





PROCEDURE NOTE: . 





DESCRIPTION OF PROCEDURE: [Met with pt in preop area and again discussed 

surgery.  Informed consent obtained.  Risks discussed including worsening of 

incontinence and others.  Pt brought to OR room.  Surgery done under iv Cipro.  

General lma anesthesia secured.  Time out performed.  Dorsal litho position.  

Well padded.  Prep'd and draped in sterile fashion.  Urethrotome sheath advanced

 into urethra with its obturator.  Urethrotome then assembled.  Prostatic 

urethra with much scarring.  Tissue pale.  True lumen at 12 o'clock.  Wire 

advanced into bladder.  Stricture cut at 12 o'clock.  Also cut at 3 and 9.  

Couldn't pass scope initially but after cutting, no problem.  Bladder inspected.

  No stone or tumor or acute cystitis.  Urine collected for cx.  Once satisfied 

with opening stricture, 20fr [place] placed over wire.  Cut until bleeding 

noted.  Bladder irrigated at end.  Bleeding not as issue.  Pt left room in 

satisfactory condition.





Home with Bactrim, Pyridium and oxybutynin.].











DEMETRIS CAMILO MD             Aug 5, 2021 08:40

## 2021-12-16 ENCOUNTER — HOSPITAL ENCOUNTER (OUTPATIENT)
Dept: HOSPITAL 53 - M PAIN | Age: 81
End: 2021-12-16
Attending: ANESTHESIOLOGY
Payer: COMMERCIAL

## 2021-12-16 DIAGNOSIS — M54.50: Primary | ICD-10-CM

## 2021-12-16 DIAGNOSIS — Z79.82: ICD-10-CM

## 2021-12-16 DIAGNOSIS — Z79.84: ICD-10-CM

## 2021-12-16 DIAGNOSIS — Z79.891: ICD-10-CM

## 2021-12-16 DIAGNOSIS — Z87.891: ICD-10-CM

## 2021-12-16 DIAGNOSIS — I10: ICD-10-CM

## 2021-12-16 DIAGNOSIS — Z87.442: ICD-10-CM

## 2021-12-16 DIAGNOSIS — F32.A: ICD-10-CM

## 2021-12-16 DIAGNOSIS — Z79.899: ICD-10-CM

## 2021-12-16 DIAGNOSIS — Z79.1: ICD-10-CM

## 2021-12-16 DIAGNOSIS — Z85.46: ICD-10-CM

## 2021-12-16 DIAGNOSIS — K21.00: ICD-10-CM

## 2022-08-19 ENCOUNTER — HOSPITAL ENCOUNTER (OUTPATIENT)
Dept: HOSPITAL 53 - M PAIN | Age: 82
End: 2022-08-19
Attending: FAMILY MEDICINE
Payer: COMMERCIAL

## 2022-08-19 DIAGNOSIS — M51.16: Primary | ICD-10-CM

## 2022-08-19 DIAGNOSIS — Z79.82: ICD-10-CM

## 2022-08-19 DIAGNOSIS — R35.1: ICD-10-CM

## 2022-08-19 DIAGNOSIS — N40.1: ICD-10-CM

## 2022-08-19 DIAGNOSIS — Z85.46: ICD-10-CM

## 2022-08-19 DIAGNOSIS — Z87.891: ICD-10-CM

## 2022-08-19 DIAGNOSIS — Z98.890: ICD-10-CM

## 2022-08-19 DIAGNOSIS — K21.00: ICD-10-CM

## 2022-08-19 DIAGNOSIS — Z79.84: ICD-10-CM

## 2022-08-19 DIAGNOSIS — F32.A: ICD-10-CM

## 2022-08-19 DIAGNOSIS — Z87.442: ICD-10-CM

## 2022-08-19 DIAGNOSIS — I10: ICD-10-CM

## 2022-08-19 DIAGNOSIS — Z92.3: ICD-10-CM

## 2022-08-19 DIAGNOSIS — R39.12: ICD-10-CM

## 2022-08-19 DIAGNOSIS — Z79.1: ICD-10-CM

## 2022-08-19 DIAGNOSIS — Z79.891: ICD-10-CM

## 2022-11-21 ENCOUNTER — HOSPITAL ENCOUNTER (OUTPATIENT)
Dept: HOSPITAL 53 - M PAIN | Age: 82
End: 2022-11-21
Attending: FAMILY MEDICINE
Payer: COMMERCIAL

## 2022-11-21 DIAGNOSIS — Z85.46: ICD-10-CM

## 2022-11-21 DIAGNOSIS — I12.9: ICD-10-CM

## 2022-11-21 DIAGNOSIS — Z79.82: ICD-10-CM

## 2022-11-21 DIAGNOSIS — Z87.891: ICD-10-CM

## 2022-11-21 DIAGNOSIS — Z79.84: ICD-10-CM

## 2022-11-21 DIAGNOSIS — R35.1: ICD-10-CM

## 2022-11-21 DIAGNOSIS — K21.00: ICD-10-CM

## 2022-11-21 DIAGNOSIS — Z79.891: ICD-10-CM

## 2022-11-21 DIAGNOSIS — R39.12: ICD-10-CM

## 2022-11-21 DIAGNOSIS — N18.9: ICD-10-CM

## 2022-11-21 DIAGNOSIS — N40.1: ICD-10-CM

## 2022-11-21 DIAGNOSIS — Z87.442: ICD-10-CM

## 2022-11-21 DIAGNOSIS — M51.16: Primary | ICD-10-CM

## 2022-11-21 DIAGNOSIS — F32.A: ICD-10-CM

## 2022-11-21 DIAGNOSIS — M54.50: ICD-10-CM

## 2022-11-21 DIAGNOSIS — Z79.1: ICD-10-CM

## 2022-11-21 DIAGNOSIS — Z79.899: ICD-10-CM

## 2024-08-14 ENCOUNTER — HOSPITAL ENCOUNTER (OUTPATIENT)
Dept: HOSPITAL 53 - M WHC | Age: 84
End: 2024-08-14
Attending: UROLOGY
Payer: COMMERCIAL

## 2024-08-14 DIAGNOSIS — N28.89: ICD-10-CM

## 2024-08-14 DIAGNOSIS — N26.1: ICD-10-CM

## 2024-08-14 DIAGNOSIS — N17.9: Primary | ICD-10-CM

## 2024-08-15 ENCOUNTER — HOSPITAL ENCOUNTER (OUTPATIENT)
Dept: HOSPITAL 53 - M SDC | Age: 84
Discharge: HOME | End: 2024-08-15
Attending: UROLOGY
Payer: COMMERCIAL

## 2024-08-15 VITALS — SYSTOLIC BLOOD PRESSURE: 167 MMHG | TEMPERATURE: 97.7 F | OXYGEN SATURATION: 95 % | DIASTOLIC BLOOD PRESSURE: 74 MMHG

## 2024-08-15 VITALS — WEIGHT: 190.6 LBS | HEIGHT: 69 IN | BODY MASS INDEX: 28.23 KG/M2

## 2024-08-15 DIAGNOSIS — E11.9: ICD-10-CM

## 2024-08-15 DIAGNOSIS — J44.9: ICD-10-CM

## 2024-08-15 DIAGNOSIS — Z79.899: ICD-10-CM

## 2024-08-15 DIAGNOSIS — Z87.891: ICD-10-CM

## 2024-08-15 DIAGNOSIS — Z79.84: ICD-10-CM

## 2024-08-15 DIAGNOSIS — E78.00: ICD-10-CM

## 2024-08-15 DIAGNOSIS — N32.0: Primary | ICD-10-CM

## 2024-08-15 DIAGNOSIS — Z85.46: ICD-10-CM

## 2024-08-15 DIAGNOSIS — K21.9: ICD-10-CM

## 2024-08-15 DIAGNOSIS — Z90.49: ICD-10-CM

## 2024-08-15 DIAGNOSIS — I10: ICD-10-CM

## 2024-08-15 DIAGNOSIS — Z79.82: ICD-10-CM

## 2024-08-15 DIAGNOSIS — Z92.3: ICD-10-CM

## 2024-08-15 DIAGNOSIS — Z79.1: ICD-10-CM

## 2024-08-15 DIAGNOSIS — I71.20: ICD-10-CM

## 2024-08-15 PROCEDURE — 52276 CYSTOSCOPY AND TREATMENT: CPT

## 2024-08-15 PROCEDURE — 87086 URINE CULTURE/COLONY COUNT: CPT

## 2024-08-15 RX ADMIN — CEFAZOLIN SODIUM ONE MLS/HR: 2 SOLUTION INTRAVENOUS at 07:26

## 2024-08-15 RX ADMIN — LIDOCAINE HYDROCHLORIDE ONE DOSE: 20 JELLY TOPICAL at 07:30

## 2025-02-24 ENCOUNTER — HOSPITAL ENCOUNTER (OUTPATIENT)
Dept: HOSPITAL 53 - M SHH | Age: 85
End: 2025-02-24
Attending: FAMILY MEDICINE
Payer: COMMERCIAL

## 2025-02-24 DIAGNOSIS — E87.5: Primary | ICD-10-CM

## 2025-02-24 LAB
BUN SERPL-MCNC: 32 MG/DL (ref 9–23)
CALCIUM SERPL-MCNC: 8.2 MG/DL (ref 8.3–10.6)
CHLORIDE SERPL-SCNC: 104 MMOL/L (ref 98–107)
CO2 SERPL-SCNC: 31 MMOL/L (ref 20–31)
CREAT SERPL-MCNC: 2.16 MG/DL (ref 0.7–1.3)
GFR SERPL CREATININE-BSD FRML MDRD: 31.2 ML/MIN/{1.73_M2} (ref 35–?)
GLUCOSE SERPL-MCNC: 101 MG/DL (ref 74–106)
POTASSIUM SERPL-SCNC: 5.4 MMOL/L (ref 3.5–5.1)
SODIUM SERPL-SCNC: 140 MMOL/L (ref 136–145)